# Patient Record
Sex: FEMALE | URBAN - METROPOLITAN AREA
[De-identification: names, ages, dates, MRNs, and addresses within clinical notes are randomized per-mention and may not be internally consistent; named-entity substitution may affect disease eponyms.]

---

## 2022-09-24 ENCOUNTER — OFFICE VISIT (OUTPATIENT)
Dept: URBAN - METROPOLITAN AREA CLINIC 71 | Facility: CLINIC | Age: 57
End: 2022-09-24
Payer: COMMERCIAL

## 2022-09-24 DIAGNOSIS — H02.59 DEFICIENT BLINK REFLEX: ICD-10-CM

## 2022-09-24 DIAGNOSIS — H35.372 PUCKERING OF MACULA, LEFT EYE: ICD-10-CM

## 2022-09-24 DIAGNOSIS — H04.123 DRY EYE SYNDROME OF BILATERAL LACRIMAL GLANDS: Primary | ICD-10-CM

## 2022-09-24 DIAGNOSIS — H02.889 MEIBOMIAN GLAND DYSFUNCTION OF EYE: ICD-10-CM

## 2022-09-24 DIAGNOSIS — H25.13 AGE-RELATED NUCLEAR CATARACT, BILATERAL: ICD-10-CM

## 2022-09-24 DIAGNOSIS — H43.812 VITREOUS DEGENERATION, LEFT EYE: ICD-10-CM

## 2022-09-24 PROCEDURE — 99203 OFFICE O/P NEW LOW 30 MIN: CPT

## 2022-09-24 PROCEDURE — 92134 CPTRZ OPH DX IMG PST SGM RTA: CPT

## 2022-09-24 ASSESSMENT — INTRAOCULAR PRESSURE
OD: 8
OS: 8

## 2022-09-24 NOTE — IMPRESSION/PLAN
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 Incomplete blink OU Plan: Discussed DX with pt Explained that incomplete blinking can attribute to pt dry eye and MGD. Recommend MGD and dry eye treatment See plan 1 and 2

## 2022-09-24 NOTE — IMPRESSION/PLAN
Impression: Puckering of macula, left eye: H35.372. OCT ordered and done today ERM OS  Plan: Discussed diagnosis in detail with patient. Discussed treatment options with patient, though treatment is not required or recommended at this time. Will continue to observe condition and or symptoms. Reassured patient of current condition and treatment.

## 2022-09-24 NOTE — IMPRESSION/PLAN
Impression: Dry eye syndrome of bilateral lacrimal glands: H04.123. Plan: Educated pt on dry eye, the effects it can have on the eyes and VA, along with treatment options. Recommend OTC artificial tear use 2-4x daily w/ emphasis on QAM and QHS doses. Discussed possible improvement with thicker gel or ointment QHS. Recommend warm compress w/ lid massage. If symptoms continue/worsen will consider prescription drug therapy.

## 2022-09-24 NOTE — IMPRESSION/PLAN
Impression: Vitreous degeneration, left eye: H43.762. Plan: Discussed diagnosis in detail with patient. No treatment is required or recommended at this time. Discussed signs and symptoms of PVD/floaters. Discussed signs and symptoms of retinal detachment.  Will continue to observe condition and or symptoms yearly with DFE

## 2022-09-24 NOTE — IMPRESSION/PLAN
Impression: Meibomian gland dysfunction of eye: H02.889. Plan: Discussed diagnosis in detail with patient, along with treatment options. Patient instructed to apply warm compresses at least QD for minimum 10 minutes. Lid scrubs and hygiene were explained. Patient instructed to use artificial tears as needed for comfort. Recommend and educated pt on MGD expression, due to severity of MGD. Explained to pt that we would do the first expression then discuss how often pt would need to be seen.

## 2022-10-08 ENCOUNTER — OFFICE VISIT (OUTPATIENT)
Dept: URBAN - METROPOLITAN AREA CLINIC 71 | Facility: CLINIC | Age: 57
End: 2022-10-08
Payer: COMMERCIAL

## 2022-10-08 DIAGNOSIS — H04.123 DRY EYE SYNDROME OF BILATERAL LACRIMAL GLANDS: ICD-10-CM

## 2022-10-08 DIAGNOSIS — H02.889 MEIBOMIAN GLAND DYSFUNCTION OF EYE: Primary | ICD-10-CM

## 2022-10-08 PROCEDURE — 99212 OFFICE O/P EST SF 10 MIN: CPT

## 2022-10-08 ASSESSMENT — INTRAOCULAR PRESSURE
OS: 10
OD: 8

## 2022-10-08 NOTE — IMPRESSION/PLAN
Impression: Meibomian gland dysfunction of eye: H02.889. Bilateral. Plan: Expression of glands discussed with PT. After further discussion PT elects to proceed with gland expression today. Heat mask placed on patient for 10 minutes. one drop of proparicaine placed in both eyes. Glands expressed without complication. PT warned of dryness after expression. Artificial tears given in clinic to wash out excess debris. Recommend another gland expression in 4 weeks.

## 2022-10-08 NOTE — IMPRESSION/PLAN
Impression: Dry eye syndrome of bilateral lacrimal glands: H04.123. Plan: PT was unable to follow treatment plan exactly as recommended. Reiterated using OTC artificial tear use 2-4x daily w/ emphasis on QAM and QHS doses. Discussed possible improvement with thicker gel or ointment QHS.  Recommend warm compress w/ lid massage